# Patient Record
Sex: FEMALE | Race: WHITE
[De-identification: names, ages, dates, MRNs, and addresses within clinical notes are randomized per-mention and may not be internally consistent; named-entity substitution may affect disease eponyms.]

---

## 2018-02-20 ENCOUNTER — HOSPITAL ENCOUNTER (EMERGENCY)
Dept: HOSPITAL 74 - JER | Age: 23
Discharge: HOME | End: 2018-02-20
Payer: COMMERCIAL

## 2018-02-20 VITALS — BODY MASS INDEX: 30.8 KG/M2

## 2018-02-20 VITALS — DIASTOLIC BLOOD PRESSURE: 73 MMHG | SYSTOLIC BLOOD PRESSURE: 122 MMHG | TEMPERATURE: 98.8 F | HEART RATE: 79 BPM

## 2018-02-20 DIAGNOSIS — O23.11: Primary | ICD-10-CM

## 2018-02-20 DIAGNOSIS — N30.01: ICD-10-CM

## 2018-02-20 DIAGNOSIS — Z3A.08: ICD-10-CM

## 2018-02-20 LAB
APPEARANCE UR: (no result)
BASOPHILS # BLD: 0.3 % (ref 0–2)
BILIRUB UR STRIP.AUTO-MCNC: NEGATIVE MG/DL
COLOR UR: (no result)
DEPRECATED RDW RBC AUTO: 14.3 % (ref 11.6–15.6)
EOSINOPHIL # BLD: 1.2 % (ref 0–4.5)
EPITH CASTS URNS QL MICRO: (no result) /HPF
HCT VFR BLD CALC: 42.8 % (ref 32.4–45.2)
HGB BLD-MCNC: 14 GM/DL (ref 10.7–15.3)
INR BLD: 1.05 (ref 0.82–1.09)
KETONES UR QL STRIP: NEGATIVE
LEUKOCYTE ESTERASE UR QL STRIP.AUTO: (no result)
LYMPHOCYTES # BLD: 19.2 % (ref 8–40)
MCH RBC QN AUTO: 26.2 PG (ref 25.7–33.7)
MCHC RBC AUTO-ENTMCNC: 32.6 G/DL (ref 32–36)
MCV RBC: 80.5 FL (ref 80–96)
MONOCYTES # BLD AUTO: 6.7 % (ref 3.8–10.2)
MUCOUS THREADS URNS QL MICRO: (no result)
NEUTROPHILS # BLD: 72.6 % (ref 42.8–82.8)
NITRITE UR QL STRIP: NEGATIVE
PH UR: 6 [PH] (ref 5–8)
PLATELET # BLD AUTO: 214 K/MM3 (ref 134–434)
PMV BLD: 8.4 FL (ref 7.5–11.1)
PROT UR QL STRIP: NEGATIVE
PROT UR QL STRIP: NEGATIVE
PT PNL PPP: 11.9 SEC (ref 9.98–11.88)
RBC # BLD AUTO: 5.32 M/MM3 (ref 3.6–5.2)
RBC # UR STRIP: (no result) /UL
SP GR UR: 1.02 (ref 1–1.03)
UROBILINOGEN UR STRIP-MCNC: NEGATIVE MG/DL (ref 0.2–1)
WBC # BLD AUTO: 12.6 K/MM3 (ref 4–10)

## 2018-02-20 NOTE — PDOC
*Physical Exam





- Vital Signs


 Last Vital Signs











Temp Pulse Resp BP Pulse Ox


 


 98.8 F   79   18   122/73   100 


 


 02/20/18 12:29  02/20/18 12:29  02/20/18 12:29  02/20/18 12:29  02/20/18 12:29














ED Treatment Course





- LABORATORY


CBC & Chemistry Diagram: 


 02/20/18 14:20








Medical Decision Making





- Medical Decision Making





02/20/18 17:00


Ms Mora is a 21-year-old female presents emergency department with a 

complaint of vaginal bleeding.


Patient denies dysuria.


Patient states she is passing clots.


No flank pain.


Patient denies lightheadedness, dizziness, chest pain, shortness of breath





On examination:


Mild lower abdominal tenderness.


Pelvic examination per MONTSERRAT Del Castillo








02/20/18 17:51


 Laboratory Tests











  02/20/18 02/20/18 02/20/18





  14:20 14:20 14:20


 


WBC  12.6 H  


 


Hgb  14.0  D  


 


Hct  42.8  


 


Plt Count  214  


 


INR   1.05 


 


Beta HCG, Quant    55080.3











Patient pending ultrasound





Pt seen by Midlevel Provider under my direct supervision


Pt interviewed and examined


Ancillary studies reviewed





I agree with plan as outlined by Midlevel Provider





*DC/Admit/Observation/Transfer


Diagnosis at time of Disposition: 


 UTI (urinary tract infection), Pregnancy








- Discharge Dispostion


Disposition: HOME


Condition at time of disposition: Stable





- Prescriptions


Prescriptions: 


Nitrofurantoin Monohyd/M-Cryst [Macrobid -] 100 mg PO BID #14 capsule





- Referrals


Referrals: 


Trever Pinon MD [Staff Physician] - 





- Patient Instructions


Printed Discharge Instructions:  DI for Urinary Tract Infection (UTI)


Additional Instructions: 


Your ultrasound today showed 1 intrauterine pregnancy approximately 8 weeks 

along with a heart rate of 164. Ultrasound also showed a cyst. This may be the 

reason for your vaginal bleeding today. You also have a urinary tract 

infection. You were prescribed Macrobid. Please take this medication up 

tomorrow and take it twice a day for one week. Please drink plenty of fluids. 

Follow-up with your OB/GYN this week. You may have Tylenol as needed for pain. 

Please follow the dosing instructions on the bottle.





Return to the emergency department if you have worsening bleeding, cramping, 

lightheadedness, dizziness or any changes in her symptoms.





- Post Discharge Activity


Forms/Work/School Notes:  Back to Work

## 2018-02-20 NOTE — PDOC
History of Present Illness





- General


Chief Complaint: Vaginal Bleeding


Stated Complaint: VAGINAL BLEEDING (PREGNANT)


Time Seen by Provider: 02/20/18 13:57





Past History





- Past Medical History


Allergies/Adverse Reactions: 


 Allergies











Allergy/AdvReac Type Severity Reaction Status Date / Time


 


Penicillins Allergy Severe Swelling Verified 02/20/18 12:28











Home Medications: 


Ambulatory Orders





Nitrofurantoin Monohyd/M-Cryst [Macrobid -] 100 mg PO BID #14 capsule 02/20/18 








Asthma: Yes


Cancer: No


Cardiac Disorders: No


COPD: No


Diabetes: No


HTN: No


Seizures: No


Thyroid Disease: No





- Suicide/Smoking/Psychosocial Hx


Smoking History: Never smoked


Have you smoked in the past 12 months: No


Number of Cigarettes Smoked Daily: 2


If you are a former smoker, when did you quit?: 1 yr ago


Information on smoking cessation initiated: No


'Breaking Loose' booklet given: 11/26/14


Hx Alcohol Use: No


Drug/Substance Use Hx: No


Substance Use Type: None


Hx Substance Use Treatment: No





*Physical Exam





- Vital Signs


 Last Vital Signs











Temp Pulse Resp BP Pulse Ox


 


 98.8 F   79   18   122/73   100 


 


 02/20/18 12:29  02/20/18 12:29  02/20/18 12:29  02/20/18 12:29  02/20/18 12:29














ED Treatment Course





- LABORATORY


CBC & Chemistry Diagram: 


 02/20/18 14:20








*DC/Admit/Observation/Transfer


Diagnosis at time of Disposition: 


UTI (urinary tract infection)


Qualifiers:


 Urinary tract infection type: acute cystitis Hematuria presence: with 

hematuria Qualified Code(s): N30.01 - Acute cystitis with hematuria





Pregnancy


Qualifiers:


 Weeks of gestation: 8 weeks Qualified Code(s): Z3A.08 - 8 weeks gestation of 

pregnancy








- Discharge Dispostion


Disposition: HOME


Condition at time of disposition: Stable


Admit: No





- Referrals


Referrals: 


Trever Pinon MD [Staff Physician] - 





- Patient Instructions


Printed Discharge Instructions:  DI for Urinary Tract Infection (UTI)


Additional Instructions: 


Your ultrasound today showed 1 intrauterine pregnancy approximately 8 weeks 

along with a heart rate of 164. Ultrasound also showed a cyst. This may be the 

reason for your vaginal bleeding today. You also have a urinary tract 

infection. You were prescribed Macrobid. Please take this medication up 

tomorrow and take it twice a day for one week. Please drink plenty of fluids. 

Follow-up with your OB/GYN this week. You may have Tylenol as needed for pain. 

Please follow the dosing instructions on the bottle.





Return to the emergency department if you have worsening bleeding, cramping, 

lightheadedness, dizziness or any changes in her symptoms.





- Post Discharge Activity


Forms/Work/School Notes:  Back to Work

## 2018-12-05 ENCOUNTER — HOSPITAL ENCOUNTER (EMERGENCY)
Dept: HOSPITAL 74 - JERFT | Age: 23
Discharge: HOME | End: 2018-12-05
Payer: COMMERCIAL

## 2018-12-05 VITALS — BODY MASS INDEX: 34.2 KG/M2

## 2018-12-05 VITALS — TEMPERATURE: 98.3 F | HEART RATE: 72 BPM | SYSTOLIC BLOOD PRESSURE: 108 MMHG | DIASTOLIC BLOOD PRESSURE: 48 MMHG

## 2018-12-05 DIAGNOSIS — J06.9: Primary | ICD-10-CM

## 2018-12-05 DIAGNOSIS — F17.210: ICD-10-CM

## 2018-12-05 DIAGNOSIS — J02.9: ICD-10-CM

## 2018-12-05 DIAGNOSIS — J45.909: ICD-10-CM

## 2018-12-05 NOTE — PDOC
History of Present Illness





- General


Chief Complaint: Cold Symptoms


Stated Complaint: COLD SYMPTOMS


Time Seen by Provider: 12/05/18 13:04


History Source: Patient


Exam Limitations: Clinical Condition





- History of Present Illness


Initial Comments: 





12/05/18 13:54


Patient with no significant past medication present with complaint of 2 day 

history of sore throat, body aches, nasal congestion, runny nose and Tactile 

fever and chills with body aches. Patient denies nausea or vomiting or 

abdominal pain. Patient denies any other symptoms


Timing/Duration: other (2 days)





Past History





- Past Medical History


Allergies/Adverse Reactions: 


 Allergies











Allergy/AdvReac Type Severity Reaction Status Date / Time


 


Penicillins Allergy Severe Swelling Verified 12/05/18 12:33











Home Medications: 


Ambulatory Orders





Azithromycin [Zithromax Tri-Floyd (3 DAYS) -] 500 mg PO DAILY #3 tablet 12/05/18 


Ipratropium Bromide 2 spray NS BID PRN #1 spray 12/05/18 


Methylprednisolone [Medrol Dose Floyd] 4 mg PO ASDIR #21 tablet 12/05/18 








Asthma: Yes


Cancer: No


Cardiac Disorders: No


COPD: No


CHF: No


Diabetes: No


HTN: No


Seizures: No


Thyroid Disease: No





- Suicide/Smoking/Psychosocial Hx


Smoking History: Current every day smoker


Have you smoked in the past 12 months: No


Number of Cigarettes Smoked Daily: 3


If you are a former smoker, when did you quit?: 1 yr ago


Information on smoking cessation initiated: No


'Breaking Loose' booklet given: 11/26/14


Hx Alcohol Use: No


Drug/Substance Use Hx: No


Substance Use Type: None


Hx Substance Use Treatment: No





**Review of Systems





- Review of Systems


Able to Perform ROS?: Yes


Is the patient limited English proficient: No


Constitutional: Yes: Chills, Fever, Malaise


HEENTM: Yes: Symptoms Reported, See HPI, Nose Congestion, Throat Pain.  No: Eye 

Pain, Blurred Vision, Tearing, Recent change in vision, Double Vision, Cataracts

, Ear Pain, Ocular Prothesis, Ear Discharge, Nose Pain, Tinnitus, Nose Bleeding

, Hearing Loss, Throat Swelling, Mouth Pain, Dental Problems, Difficulty 

Swallowing, Mouth Swelling, Other


Respiratory: Yes: Symptoms reported, See HPI, Cough (intermittent).  No: 

Orthopnea, Shortness of Breath, SOB with Exertion, SOB at Rest, Stridor, 

Wheezing, Productive cough, Hemoptysis, Other


Cardiac (ROS): No: Symptoms Reported, See HPI, Chest Pain, Edema, Irregular 

Heart Rate, Lightheadedness, Palpitations, Syncope, Chest Tightness, Other


ABD/GI: No: Symptoms Reported, See HPI, Abdominal Distended, Abd. Pain w/ 

defecation, Blood Streaked Bowels, Constipated, Diarrhea, Difficulty Swallowing

, Nausea, Poor Appetite, Poor Fluid Intake, Rectal Bleeding, Vomiting, 

Indigestion, Abdominal cramping, Tarry Stools, Other


All Other Systems: Reviewed and Negative





*Physical Exam





- Vital Signs


 Last Vital Signs











Temp Pulse Resp BP Pulse Ox


 


 98.3 F   72   16   108/48 L  100 


 


 12/05/18 12:34  12/05/18 12:34  12/05/18 12:34  12/05/18 12:34  12/05/18 12:34














- Physical Exam


Comments: 





12/05/18 13:56


GENERAL:


Well developed, well nourished. Awake and alert. No acute distress.


HEENT:  Normocephalic, atraumatic. PERRLA, EOMI. No conjunctival pallor. Sclera 

are non-icteric. Moist mucous membranes. Oropharynx is clear.


NECK: 


Supple. Full ROM. 


CARDIOVASCULAR:


Regular rate and rhythm. No murmurs, rubs, or gallops. Distal pulses are 2+ and 

symmetric. 


PULMONARY: 


No evidence of respiratory distress. Lungs clear to auscultation bilaterally. 

No wheezing, rales or rhonchi.


ABDOMINAL:


Soft. Non-tender. Non-distended. No rebound or guarding. No organomegaly. 

Normoactive bowel sounds. 


MUSCULOSKELETAL 


Normal range of motion at all joints. 


EXTREMITIES: 


No cyanosis. No clubbing. No edema. No calf tenderness.


SKIN: 


Warm and dry. Normal capillary refill. No rashes. No jaundice. 


NEUROLOGICAL: 


Alert, awake, appropriate.  Gait is normal without ataxia.


PSYCHIATRIC: 


Cooperative. Good eye contact. Appropriate mood 


General Appearance: Yes: Nourished, Appropriately Dressed.  No: Apparent 

Distress





Moderate Sedation





- Procedure Monitoring


Vital Signs: 


Procedure Monitoring Vital Signs











Temperature  98.3 F   12/05/18 12:34


 


Pulse Rate  72   12/05/18 12:34


 


Respiratory Rate  16   12/05/18 12:34


 


Blood Pressure  108/48 L  12/05/18 12:34


 


O2 Sat by Pulse Oximetry (%)  100   12/05/18 12:34











Medical Decision Making





- Medical Decision Making





12/05/18 13:56


Patient with no syndrome past medical history presented with complaint of 2 day 

history of URI symptoms with tactile fever and chills and sore throat. Clinical 

exam unremarkable. Patient afebrile now. Rapid strep and rapid flu tests 

ordered. Treat based on lab results


12/05/18 14:07


rapid flu neg. rapid strep still pending


12/05/18 14:35


rapid strep negative. patient will be treated emperically given pt just mention 

friend has similar episode and tested positive for strep. patient stable for 

outpatient tx with medrol-floyd, atrovent nasal spray and zithromax tri-floyd





*DC/Admit/Observation/Transfer


Diagnosis at time of Disposition: 


URI (upper respiratory infection)


Qualifiers:


 URI type: unspecified URI Qualified Code(s): J06.9 - Acute upper respiratory 

infection, unspecified





Pharyngitis


Qualifiers:


 Pharyngitis/tonsillitis etiology: unspecified etiology Qualified Code(s): 

J02.9 - Acute pharyngitis, unspecified








- Discharge Dispostion


Disposition: HOME


Condition at time of disposition: Stable


Decision to Admit order: No





- Prescriptions


Prescriptions: 


Azithromycin [Zithromax Tri-Floyd (3 DAYS) -] 500 mg PO DAILY #3 tablet


Ipratropium Bromide 2 spray NS BID PRN #1 spray


 PRN Reason: nasal congestion


Methylprednisolone [Medrol Dose Floyd] 4 mg PO ASDIR #21 tablet





- Referrals


Referrals: 


Joy Phoenix MD [Primary Care Provider] - 





- Patient Instructions


Printed Discharge Instructions:  DI for Viral Upper Respiratory Infection -- 

Adult


Additional Instructions: 


your rapid strep and flu was negative. you will be contacted with throat 

culture results. take medications as prescribed. increase fluid intake. follow-

up with PCP as needed





- Post Discharge Activity

## 2019-09-03 ENCOUNTER — HOSPITAL ENCOUNTER (EMERGENCY)
Dept: HOSPITAL 74 - JER | Age: 24
Discharge: LEFT BEFORE DISCHARGE | End: 2019-09-03
Payer: COMMERCIAL

## 2021-01-03 ENCOUNTER — HOSPITAL ENCOUNTER (INPATIENT)
Dept: HOSPITAL 74 - JDEL | Age: 26
LOS: 4 days | Discharge: HOME | DRG: 540 | End: 2021-01-07
Attending: OBSTETRICS & GYNECOLOGY | Admitting: OBSTETRICS & GYNECOLOGY
Payer: COMMERCIAL

## 2021-01-03 VITALS — BODY MASS INDEX: 35 KG/M2

## 2021-01-03 DIAGNOSIS — Z3A.35: ICD-10-CM

## 2021-01-03 DIAGNOSIS — Z87.59: ICD-10-CM

## 2021-01-03 DIAGNOSIS — Z88.0: ICD-10-CM

## 2021-01-03 DIAGNOSIS — Z87.09: ICD-10-CM

## 2021-01-03 LAB
AMPHET UR-MCNC: NEGATIVE NG/ML
ANION GAP SERPL CALC-SCNC: 9 MMOL/L (ref 8–16)
ANISOCYTOSIS BLD QL: (no result)
APTT BLD: 28.2 SECONDS (ref 25.2–36.5)
BARBITURATES UR-MCNC: NEGATIVE NG/ML
BASOPHILS # BLD: 0.2 % (ref 0–2)
BENZODIAZ UR SCN-MCNC: NEGATIVE NG/ML
BUN SERPL-MCNC: 4.6 MG/DL (ref 7–18)
CALCIUM SERPL-MCNC: 8.2 MG/DL (ref 8.5–10.1)
CHLORIDE SERPL-SCNC: 107 MMOL/L (ref 98–107)
CO2 SERPL-SCNC: 22 MMOL/L (ref 21–32)
COCAINE UR-MCNC: NEGATIVE NG/ML
CREAT SERPL-MCNC: 0.4 MG/DL (ref 0.55–1.3)
DEPRECATED RDW RBC AUTO: 14.5 % (ref 11.6–15.6)
EOSINOPHIL # BLD: 1 % (ref 0–4.5)
GLUCOSE SERPL-MCNC: 68 MG/DL (ref 74–106)
HCT VFR BLD CALC: 32.8 % (ref 32.4–45.2)
HGB BLD-MCNC: 10.8 GM/DL (ref 10.7–15.3)
INR BLD: 1.06 (ref 0.83–1.09)
LYMPHOCYTES # BLD: 15.4 % (ref 8–40)
MACROCYTES BLD QL: 0
MCH RBC QN AUTO: 26.4 PG (ref 25.7–33.7)
MCHC RBC AUTO-ENTMCNC: 32.8 G/DL (ref 32–36)
MCV RBC: 80.5 FL (ref 80–96)
METHADONE UR-MCNC: NEGATIVE NG/ML
MONOCYTES # BLD AUTO: 7.5 % (ref 3.8–10.2)
NEUTROPHILS # BLD: 75.9 % (ref 42.8–82.8)
OPIATES UR QL SCN: NEGATIVE NG/ML
PCP UR QL SCN: NEGATIVE NG/ML
PLATELET # BLD AUTO: 257 K/MM3 (ref 134–434)
PLATELET BLD QL SMEAR: (no result)
PMV BLD: 9.4 FL (ref 7.5–11.1)
POTASSIUM SERPLBLD-SCNC: 4 MMOL/L (ref 3.5–5.1)
PT PNL PPP: 12.8 SEC (ref 9.7–13)
RBC # BLD AUTO: 4.07 M/MM3 (ref 3.6–5.2)
SODIUM SERPL-SCNC: 139 MMOL/L (ref 136–145)
WBC # BLD AUTO: 19.4 K/MM3 (ref 4–10)

## 2021-01-03 PROCEDURE — 3E033VJ INTRODUCTION OF OTHER HORMONE INTO PERIPHERAL VEIN, PERCUTANEOUS APPROACH: ICD-10-PCS | Performed by: OBSTETRICS & GYNECOLOGY

## 2021-01-03 PROCEDURE — U0003 INFECTIOUS AGENT DETECTION BY NUCLEIC ACID (DNA OR RNA); SEVERE ACUTE RESPIRATORY SYNDROME CORONAVIRUS 2 (SARS-COV-2) (CORONAVIRUS DISEASE [COVID-19]), AMPLIFIED PROBE TECHNIQUE, MAKING USE OF HIGH THROUGHPUT TECHNOLOGIES AS DESCRIBED BY CMS-2020-01-R: HCPCS

## 2021-01-03 PROCEDURE — C9803 HOPD COVID-19 SPEC COLLECT: HCPCS

## 2021-01-03 PROCEDURE — 10H073Z INSERTION OF MONITORING ELECTRODE INTO PRODUCTS OF CONCEPTION, VIA NATURAL OR ARTIFICIAL OPENING: ICD-10-PCS | Performed by: OBSTETRICS & GYNECOLOGY

## 2021-01-04 LAB
BASOPHILS # BLD: 0.2 % (ref 0–2)
DEPRECATED RDW RBC AUTO: 14.1 % (ref 11.6–15.6)
EOSINOPHIL # BLD: 0.7 % (ref 0–4.5)
HCT VFR BLD CALC: 33.6 % (ref 32.4–45.2)
HGB BLD-MCNC: 11.1 GM/DL (ref 10.7–15.3)
LYMPHOCYTES # BLD: 13.2 % (ref 8–40)
MCH RBC QN AUTO: 26.3 PG (ref 25.7–33.7)
MCHC RBC AUTO-ENTMCNC: 33.1 G/DL (ref 32–36)
MCV RBC: 79.4 FL (ref 80–96)
MONOCYTES # BLD AUTO: 9.2 % (ref 3.8–10.2)
NEUTROPHILS # BLD: 76.7 % (ref 42.8–82.8)
PLATELET # BLD AUTO: 266 K/MM3 (ref 134–434)
PMV BLD: 8.7 FL (ref 7.5–11.1)
RBC # BLD AUTO: 4.23 M/MM3 (ref 3.6–5.2)
WBC # BLD AUTO: 18.8 K/MM3 (ref 4–10)

## 2021-01-04 RX ADMIN — IBUPROFEN PRN MG: 600 TABLET, FILM COATED ORAL at 13:46

## 2021-01-04 RX ADMIN — ACETAMINOPHEN PRN MG: 325 TABLET ORAL at 22:32

## 2021-01-04 RX ADMIN — IBUPROFEN PRN MG: 600 TABLET, FILM COATED ORAL at 18:21

## 2021-01-04 RX ADMIN — IBUPROFEN PRN MG: 600 TABLET, FILM COATED ORAL at 22:32

## 2021-01-04 RX ADMIN — FERROUS SULFATE TAB EC 324 MG (65 MG FE EQUIVALENT) SCH MG: 324 (65 FE) TABLET DELAYED RESPONSE at 22:31

## 2021-01-04 RX ADMIN — SIMETHICONE CHEW TAB 80 MG PRN MG: 80 TABLET ORAL at 22:32

## 2021-01-04 RX ADMIN — Medication SCH: at 09:37

## 2021-01-04 RX ADMIN — ACETAMINOPHEN PRN MG: 325 TABLET ORAL at 18:21

## 2021-01-04 RX ADMIN — ACETAMINOPHEN PRN MG: 325 TABLET ORAL at 13:46

## 2021-01-04 RX ADMIN — SIMETHICONE CHEW TAB 80 MG PRN MG: 80 TABLET ORAL at 18:22

## 2021-01-04 RX ADMIN — FERROUS SULFATE TAB EC 324 MG (65 MG FE EQUIVALENT) SCH: 324 (65 FE) TABLET DELAYED RESPONSE at 09:37

## 2021-01-05 RX ADMIN — IBUPROFEN PRN MG: 600 TABLET, FILM COATED ORAL at 07:31

## 2021-01-05 RX ADMIN — Medication SCH TAB: at 10:34

## 2021-01-05 RX ADMIN — SIMETHICONE CHEW TAB 80 MG PRN MG: 80 TABLET ORAL at 07:32

## 2021-01-05 RX ADMIN — FERROUS SULFATE TAB EC 324 MG (65 MG FE EQUIVALENT) SCH MG: 324 (65 FE) TABLET DELAYED RESPONSE at 10:34

## 2021-01-05 RX ADMIN — FERROUS SULFATE TAB EC 324 MG (65 MG FE EQUIVALENT) SCH MG: 324 (65 FE) TABLET DELAYED RESPONSE at 21:29

## 2021-01-05 RX ADMIN — SIMETHICONE CHEW TAB 80 MG PRN MG: 80 TABLET ORAL at 19:34

## 2021-01-05 RX ADMIN — ACETAMINOPHEN PRN MG: 325 TABLET ORAL at 13:59

## 2021-01-05 RX ADMIN — IBUPROFEN PRN MG: 600 TABLET, FILM COATED ORAL at 17:25

## 2021-01-05 RX ADMIN — ACETAMINOPHEN PRN MG: 325 TABLET ORAL at 19:35

## 2021-01-05 RX ADMIN — ACETAMINOPHEN PRN MG: 325 TABLET ORAL at 07:32

## 2021-01-06 LAB
ANISOCYTOSIS BLD QL: (no result)
BASOPHILS # BLD: 0.4 % (ref 0–2)
DEPRECATED RDW RBC AUTO: 14.5 % (ref 11.6–15.6)
EOSINOPHIL # BLD: 3 % (ref 0–4.5)
HCT VFR BLD CALC: 33.9 % (ref 32.4–45.2)
HGB BLD-MCNC: 11 GM/DL (ref 10.7–15.3)
LYMPHOCYTES # BLD: 22.3 % (ref 8–40)
MACROCYTES BLD QL: 0
MCH RBC QN AUTO: 26.2 PG (ref 25.7–33.7)
MCHC RBC AUTO-ENTMCNC: 32.5 G/DL (ref 32–36)
MCV RBC: 80.7 FL (ref 80–96)
MONOCYTES # BLD AUTO: 7.3 % (ref 3.8–10.2)
NEUTROPHILS # BLD: 67 % (ref 42.8–82.8)
PLATELET # BLD AUTO: 274 K/MM3 (ref 134–434)
PLATELET BLD QL SMEAR: NORMAL
PMV BLD: 8.5 FL (ref 7.5–11.1)
RBC # BLD AUTO: 4.2 M/MM3 (ref 3.6–5.2)
WBC # BLD AUTO: 11.7 K/MM3 (ref 4–10)

## 2021-01-06 RX ADMIN — IBUPROFEN PRN MG: 600 TABLET, FILM COATED ORAL at 02:10

## 2021-01-06 RX ADMIN — ACETAMINOPHEN PRN MG: 325 TABLET ORAL at 06:18

## 2021-01-06 RX ADMIN — SIMETHICONE CHEW TAB 80 MG PRN MG: 80 TABLET ORAL at 10:20

## 2021-01-06 RX ADMIN — SIMETHICONE CHEW TAB 80 MG PRN MG: 80 TABLET ORAL at 20:02

## 2021-01-06 RX ADMIN — IBUPROFEN PRN MG: 600 TABLET, FILM COATED ORAL at 20:00

## 2021-01-06 RX ADMIN — FERROUS SULFATE TAB EC 324 MG (65 MG FE EQUIVALENT) SCH MG: 324 (65 FE) TABLET DELAYED RESPONSE at 10:20

## 2021-01-06 RX ADMIN — SIMETHICONE CHEW TAB 80 MG PRN MG: 80 TABLET ORAL at 02:09

## 2021-01-06 RX ADMIN — ACETAMINOPHEN PRN MG: 325 TABLET ORAL at 20:01

## 2021-01-06 RX ADMIN — ACETAMINOPHEN PRN MG: 325 TABLET ORAL at 10:20

## 2021-01-06 RX ADMIN — ACETAMINOPHEN PRN MG: 325 TABLET ORAL at 14:38

## 2021-01-06 RX ADMIN — Medication SCH TAB: at 10:20

## 2021-01-06 RX ADMIN — IBUPROFEN PRN MG: 600 TABLET, FILM COATED ORAL at 06:19

## 2021-01-06 RX ADMIN — IBUPROFEN PRN MG: 600 TABLET, FILM COATED ORAL at 14:36

## 2021-01-06 RX ADMIN — SIMETHICONE CHEW TAB 80 MG PRN MG: 80 TABLET ORAL at 14:36

## 2021-01-06 RX ADMIN — FERROUS SULFATE TAB EC 324 MG (65 MG FE EQUIVALENT) SCH: 324 (65 FE) TABLET DELAYED RESPONSE at 22:00

## 2021-01-07 VITALS — SYSTOLIC BLOOD PRESSURE: 132 MMHG | HEART RATE: 68 BPM | TEMPERATURE: 98.3 F | DIASTOLIC BLOOD PRESSURE: 63 MMHG

## 2021-01-07 RX ADMIN — SIMETHICONE CHEW TAB 80 MG PRN MG: 80 TABLET ORAL at 00:51

## 2021-01-07 RX ADMIN — IBUPROFEN PRN MG: 600 TABLET, FILM COATED ORAL at 06:24

## 2021-01-07 RX ADMIN — Medication SCH TAB: at 10:32

## 2021-01-07 RX ADMIN — ACETAMINOPHEN PRN MG: 325 TABLET ORAL at 10:38

## 2021-01-07 RX ADMIN — FERROUS SULFATE TAB EC 324 MG (65 MG FE EQUIVALENT) SCH MG: 324 (65 FE) TABLET DELAYED RESPONSE at 10:32

## 2021-01-07 RX ADMIN — IBUPROFEN PRN MG: 600 TABLET, FILM COATED ORAL at 10:38

## 2021-01-07 RX ADMIN — SIMETHICONE CHEW TAB 80 MG PRN MG: 80 TABLET ORAL at 10:39

## 2021-01-07 RX ADMIN — ACETAMINOPHEN PRN MG: 325 TABLET ORAL at 06:23

## 2021-01-07 RX ADMIN — SIMETHICONE CHEW TAB 80 MG PRN MG: 80 TABLET ORAL at 06:20

## 2021-01-07 RX ADMIN — IBUPROFEN PRN MG: 600 TABLET, FILM COATED ORAL at 00:48

## 2021-01-07 RX ADMIN — ACETAMINOPHEN PRN MG: 325 TABLET ORAL at 00:48

## 2022-12-07 ENCOUNTER — HOSPITAL ENCOUNTER (INPATIENT)
Dept: HOSPITAL 74 - JLDR | Age: 27
LOS: 3 days | Discharge: HOME | DRG: 540 | End: 2022-12-10
Attending: OBSTETRICS & GYNECOLOGY | Admitting: OBSTETRICS & GYNECOLOGY
Payer: COMMERCIAL

## 2022-12-07 VITALS — BODY MASS INDEX: 30.9 KG/M2

## 2022-12-07 DIAGNOSIS — F12.10: ICD-10-CM

## 2022-12-07 DIAGNOSIS — Z3A.36: ICD-10-CM

## 2022-12-07 DIAGNOSIS — F15.10: ICD-10-CM

## 2022-12-07 DIAGNOSIS — O34.211: ICD-10-CM

## 2022-12-07 DIAGNOSIS — K83.1: ICD-10-CM

## 2022-12-07 LAB
AMPHET UR-MCNC: POSITIVE NG/ML
BARBITURATES UR-MCNC: NEGATIVE UG/ML
BENZODIAZ UR SCN-MCNC: NEGATIVE NG/ML
COCAINE UR-MCNC: NEGATIVE NG/ML
METHADONE UR-MCNC: NEGATIVE UG/L
OPIATES UR QL SCN: NEGATIVE
PCP UR QL SCN: NEGATIVE

## 2022-12-07 PROCEDURE — U0003 INFECTIOUS AGENT DETECTION BY NUCLEIC ACID (DNA OR RNA); SEVERE ACUTE RESPIRATORY SYNDROME CORONAVIRUS 2 (SARS-COV-2) (CORONAVIRUS DISEASE [COVID-19]), AMPLIFIED PROBE TECHNIQUE, MAKING USE OF HIGH THROUGHPUT TECHNOLOGIES AS DESCRIBED BY CMS-2020-01-R: HCPCS

## 2022-12-07 PROCEDURE — U0005 INFEC AGEN DETEC AMPLI PROBE: HCPCS

## 2022-12-07 RX ADMIN — IBUPROFEN PRN MG: 600 TABLET, FILM COATED ORAL at 17:41

## 2022-12-07 RX ADMIN — SIMETHICONE CHEW TAB 80 MG PRN MG: 80 TABLET ORAL at 17:43

## 2022-12-07 RX ADMIN — Medication SCH MLS/HR: at 22:49

## 2022-12-07 RX ADMIN — SIMETHICONE CHEW TAB 80 MG PRN MG: 80 TABLET ORAL at 21:31

## 2022-12-07 RX ADMIN — IBUPROFEN PRN MG: 600 TABLET, FILM COATED ORAL at 21:31

## 2022-12-07 RX ADMIN — FERROUS SULFATE TAB EC 324 MG (65 MG FE EQUIVALENT) SCH MG: 324 (65 FE) TABLET DELAYED RESPONSE at 21:31

## 2022-12-07 RX ADMIN — Medication SCH MLS/HR: at 15:01

## 2022-12-08 LAB
ANISOCYTOSIS BLD QL: 0
BASO STIPL BLD QL SMEAR: 0
DACRYOCYTES BLD QL SMEAR: 0
DEPRECATED RDW RBC AUTO: 13.9 % (ref 11.6–15.6)
DOHLE BOD BLD QL SMEAR: 0
HCT VFR BLD CALC: 31.1 % (ref 32.4–45.2)
HELMET CELLS BLD QL SMEAR: 0
HGB BLD-MCNC: 10.2 GM/DL (ref 10.7–15.3)
HOWELL-JOLLY BOD BLD QL SMEAR: 0
MACROCYTES BLD QL: 0
MCH RBC QN AUTO: 26.7 PG (ref 25.7–33.7)
MCHC RBC AUTO-ENTMCNC: 33 G/DL (ref 32–36)
MCV RBC: 80.8 FL (ref 80–96)
OVALOCYTES BLD QL SMEAR: 0
PLATELET # BLD AUTO: 266 10^3/UL (ref 134–434)
PMV BLD: 8.9 FL (ref 7.5–11.1)
RBC # BLD AUTO: 3.85 M/MM3 (ref 3.6–5.2)
ROULEAUX BLD QL SMEAR: 0
SICKLE CELLS BLD QL SMEAR: 0
TARGETS BLD QL SMEAR: 0
TOXIC GRANULES BLD QL SMEAR: 0
WBC # BLD AUTO: 15.7 K/MM3 (ref 4–10)

## 2022-12-08 RX ADMIN — SIMETHICONE CHEW TAB 80 MG PRN MG: 80 TABLET ORAL at 03:03

## 2022-12-08 RX ADMIN — IBUPROFEN PRN MG: 600 TABLET, FILM COATED ORAL at 03:03

## 2022-12-08 RX ADMIN — IBUPROFEN PRN MG: 600 TABLET, FILM COATED ORAL at 13:55

## 2022-12-08 RX ADMIN — FERROUS SULFATE TAB EC 324 MG (65 MG FE EQUIVALENT) SCH MG: 324 (65 FE) TABLET DELAYED RESPONSE at 11:05

## 2022-12-08 RX ADMIN — FERROUS SULFATE TAB EC 324 MG (65 MG FE EQUIVALENT) SCH MG: 324 (65 FE) TABLET DELAYED RESPONSE at 21:20

## 2022-12-08 RX ADMIN — Medication SCH TAB: at 11:05

## 2022-12-09 RX ADMIN — ACETAMINOPHEN PRN MG: 325 TABLET ORAL at 12:31

## 2022-12-09 RX ADMIN — FERROUS SULFATE TAB EC 324 MG (65 MG FE EQUIVALENT) SCH MG: 324 (65 FE) TABLET DELAYED RESPONSE at 21:52

## 2022-12-09 RX ADMIN — IBUPROFEN PRN MG: 600 TABLET, FILM COATED ORAL at 05:33

## 2022-12-09 RX ADMIN — Medication SCH TAB: at 09:24

## 2022-12-09 RX ADMIN — SIMETHICONE CHEW TAB 80 MG PRN MG: 80 TABLET ORAL at 12:31

## 2022-12-09 RX ADMIN — IBUPROFEN PRN MG: 600 TABLET, FILM COATED ORAL at 09:24

## 2022-12-09 RX ADMIN — ACETAMINOPHEN PRN MG: 325 TABLET ORAL at 16:41

## 2022-12-09 RX ADMIN — IBUPROFEN PRN MG: 600 TABLET, FILM COATED ORAL at 13:48

## 2022-12-09 RX ADMIN — FERROUS SULFATE TAB EC 324 MG (65 MG FE EQUIVALENT) SCH MG: 324 (65 FE) TABLET DELAYED RESPONSE at 09:24

## 2022-12-09 RX ADMIN — IBUPROFEN PRN MG: 600 TABLET, FILM COATED ORAL at 20:27

## 2022-12-09 RX ADMIN — ACETAMINOPHEN PRN MG: 325 TABLET ORAL at 07:25

## 2022-12-09 RX ADMIN — SIMETHICONE CHEW TAB 80 MG PRN MG: 80 TABLET ORAL at 00:56

## 2022-12-09 RX ADMIN — SIMETHICONE CHEW TAB 80 MG PRN MG: 80 TABLET ORAL at 05:33

## 2022-12-09 RX ADMIN — SIMETHICONE CHEW TAB 80 MG PRN MG: 80 TABLET ORAL at 20:27

## 2022-12-10 VITALS
DIASTOLIC BLOOD PRESSURE: 80 MMHG | TEMPERATURE: 98.7 F | SYSTOLIC BLOOD PRESSURE: 121 MMHG | RESPIRATION RATE: 22 BRPM | HEART RATE: 60 BPM

## 2022-12-10 LAB
ANISOCYTOSIS BLD QL: 0
BASO STIPL BLD QL SMEAR: 0
DACRYOCYTES BLD QL SMEAR: 0
DEPRECATED RDW RBC AUTO: 14.3 % (ref 11.6–15.6)
DOHLE BOD BLD QL SMEAR: 0
HCT VFR BLD CALC: 34.8 % (ref 32.4–45.2)
HELMET CELLS BLD QL SMEAR: 0
HGB BLD-MCNC: 11.2 GM/DL (ref 10.7–15.3)
HOWELL-JOLLY BOD BLD QL SMEAR: 0
MACROCYTES BLD QL: 0
MCH RBC QN AUTO: 26.3 PG (ref 25.7–33.7)
MCHC RBC AUTO-ENTMCNC: 32.2 G/DL (ref 32–36)
MCV RBC: 81.9 FL (ref 80–96)
OVALOCYTES BLD QL SMEAR: 0
PLATELET # BLD AUTO: 276 10^3/UL (ref 134–434)
PMV BLD: 8.7 FL (ref 7.5–11.1)
RBC # BLD AUTO: 4.25 M/MM3 (ref 3.6–5.2)
ROULEAUX BLD QL SMEAR: 0
SICKLE CELLS BLD QL SMEAR: 0
TARGETS BLD QL SMEAR: 0
TOXIC GRANULES BLD QL SMEAR: 0
WBC # BLD AUTO: 13.4 K/MM3 (ref 4–10)

## 2022-12-10 RX ADMIN — Medication SCH TAB: at 09:31

## 2022-12-10 RX ADMIN — ACETAMINOPHEN PRN MG: 325 TABLET ORAL at 01:46

## 2022-12-10 RX ADMIN — IBUPROFEN PRN MG: 600 TABLET, FILM COATED ORAL at 14:00

## 2022-12-10 RX ADMIN — SIMETHICONE CHEW TAB 80 MG PRN MG: 80 TABLET ORAL at 09:31

## 2022-12-10 RX ADMIN — ACETAMINOPHEN PRN MG: 325 TABLET ORAL at 09:29

## 2022-12-10 RX ADMIN — FERROUS SULFATE TAB EC 324 MG (65 MG FE EQUIVALENT) SCH MG: 324 (65 FE) TABLET DELAYED RESPONSE at 09:28

## 2022-12-10 RX ADMIN — SIMETHICONE CHEW TAB 80 MG PRN MG: 80 TABLET ORAL at 01:46

## 2022-12-10 RX ADMIN — IBUPROFEN PRN MG: 600 TABLET, FILM COATED ORAL at 06:42

## 2023-03-17 ENCOUNTER — HOSPITAL ENCOUNTER (EMERGENCY)
Dept: HOSPITAL 74 - JERFT | Age: 28
Discharge: HOME | End: 2023-03-17
Payer: COMMERCIAL

## 2023-03-17 VITALS
TEMPERATURE: 98.2 F | HEART RATE: 75 BPM | DIASTOLIC BLOOD PRESSURE: 73 MMHG | SYSTOLIC BLOOD PRESSURE: 121 MMHG | RESPIRATION RATE: 20 BRPM

## 2023-03-17 VITALS — BODY MASS INDEX: 27.7 KG/M2
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
## 2023-03-17 DIAGNOSIS — T74.11XA: ICD-10-CM

## 2023-03-17 DIAGNOSIS — S30.0XXA: ICD-10-CM

## 2023-03-17 DIAGNOSIS — S05.11XA: Primary | ICD-10-CM

## 2023-07-17 ENCOUNTER — HOSPITAL ENCOUNTER (EMERGENCY)
Dept: HOSPITAL 74 - JER | Age: 28
Discharge: HOME | End: 2023-07-17
Payer: SELF-PAY

## 2023-07-17 VITALS
SYSTOLIC BLOOD PRESSURE: 127 MMHG | TEMPERATURE: 98.4 F | DIASTOLIC BLOOD PRESSURE: 75 MMHG | HEART RATE: 86 BPM | RESPIRATION RATE: 18 BRPM

## 2023-07-17 VITALS — BODY MASS INDEX: 24.7 KG/M2

## 2023-07-17 DIAGNOSIS — Z3A.00: ICD-10-CM

## 2023-07-17 DIAGNOSIS — O20.9: Primary | ICD-10-CM

## 2023-07-17 LAB
ALBUMIN SERPL-MCNC: 3.3 G/DL (ref 3.4–5)
ALP SERPL-CCNC: 62 U/L (ref 45–117)
ALT SERPL-CCNC: 29 U/L (ref 13–61)
ANION GAP SERPL CALC-SCNC: 6 MMOL/L (ref 8–16)
AST SERPL-CCNC: 51 U/L (ref 15–37)
BASOPHILS # BLD: 0.4 % (ref 0–2)
BILIRUB SERPL-MCNC: 1.2 MG/DL (ref 0.2–1)
BUN SERPL-MCNC: 12 MG/DL (ref 7–18)
CALCIUM SERPL-MCNC: 8.7 MG/DL (ref 8.5–10.1)
CHLORIDE SERPL-SCNC: 111 MMOL/L (ref 98–107)
CO2 SERPL-SCNC: 23 MMOL/L (ref 21–32)
CREAT SERPL-MCNC: 0.7 MG/DL (ref 0.55–1.3)
DEPRECATED RDW RBC AUTO: 14.9 % (ref 11.6–15.6)
EOSINOPHIL # BLD: 2.7 % (ref 0–4.5)
GLUCOSE SERPL-MCNC: 126 MG/DL (ref 74–106)
HCT VFR BLD CALC: 43.9 % (ref 32.4–45.2)
HGB BLD-MCNC: 14.5 GM/DL (ref 10.7–15.3)
LYMPHOCYTES # BLD: 27.1 % (ref 8–40)
MCH RBC QN AUTO: 27.3 PG (ref 25.7–33.7)
MCHC RBC AUTO-ENTMCNC: 33 G/DL (ref 32–36)
MCV RBC: 82.5 FL (ref 80–96)
MONOCYTES # BLD AUTO: 3.6 % (ref 3.8–10.2)
NEUTROPHILS # BLD: 66.2 % (ref 42.8–82.8)
PLATELET # BLD AUTO: 131 10^3/UL (ref 134–434)
PMV BLD: 9.1 FL (ref 7.5–11.1)
POTASSIUM SERPLBLD-SCNC: 4.9 MMOL/L (ref 3.5–5.1)
PROT SERPL-MCNC: 6.8 G/DL (ref 6.4–8.2)
RBC # BLD AUTO: 5.32 M/MM3 (ref 3.6–5.2)
SODIUM SERPL-SCNC: 140 MMOL/L (ref 136–145)
WBC # BLD AUTO: 10.1 K/MM3 (ref 4–10)

## 2024-04-12 ENCOUNTER — HOSPITAL ENCOUNTER (EMERGENCY)
Age: 29
Discharge: ANOTHER HOSPITAL | End: 2024-04-12
Payer: MEDICAID

## 2024-04-12 VITALS — DIASTOLIC BLOOD PRESSURE: 72 MMHG | HEART RATE: 74 BPM | OXYGEN SATURATION: 99 % | SYSTOLIC BLOOD PRESSURE: 120 MMHG

## 2024-04-12 VITALS
OXYGEN SATURATION: 98 % | HEART RATE: 75 BPM | TEMPERATURE: 98.06 F | SYSTOLIC BLOOD PRESSURE: 111 MMHG | RESPIRATION RATE: 20 BRPM | DIASTOLIC BLOOD PRESSURE: 82 MMHG

## 2024-04-12 VITALS — HEART RATE: 69 BPM | DIASTOLIC BLOOD PRESSURE: 78 MMHG | SYSTOLIC BLOOD PRESSURE: 133 MMHG

## 2024-04-12 VITALS
SYSTOLIC BLOOD PRESSURE: 111 MMHG | DIASTOLIC BLOOD PRESSURE: 82 MMHG | TEMPERATURE: 98 F | HEART RATE: 75 BPM | RESPIRATION RATE: 20 BRPM

## 2024-04-12 VITALS — HEART RATE: 64 BPM | DIASTOLIC BLOOD PRESSURE: 84 MMHG | SYSTOLIC BLOOD PRESSURE: 120 MMHG

## 2024-04-12 VITALS — DIASTOLIC BLOOD PRESSURE: 73 MMHG | SYSTOLIC BLOOD PRESSURE: 132 MMHG | HEART RATE: 68 BPM

## 2024-04-12 VITALS — SYSTOLIC BLOOD PRESSURE: 126 MMHG | OXYGEN SATURATION: 98 % | DIASTOLIC BLOOD PRESSURE: 74 MMHG | HEART RATE: 63 BPM

## 2024-04-12 VITALS — SYSTOLIC BLOOD PRESSURE: 137 MMHG | HEART RATE: 66 BPM | OXYGEN SATURATION: 98 % | DIASTOLIC BLOOD PRESSURE: 87 MMHG

## 2024-04-12 VITALS — BODY MASS INDEX: 35.5 KG/M2

## 2024-04-12 DIAGNOSIS — Z3A.37: ICD-10-CM

## 2024-04-12 DIAGNOSIS — O34.219: ICD-10-CM

## 2024-04-12 LAB
ANION GAP: 14 (ref 12–20)
BLOOD UREA NITROGEN: 9 MG/DL (ref 9–16)
CALCIUM: 8 MG/DL (ref 8.4–10.2)
CARBON DIOXIDE: 19 MMOL/L (ref 22–29)
CHLORIDE: 109 MMOL/L (ref 96–108)
CREAT CL PREDICTED SERPL C-G-VRATE: (no result) ML/MIN
ESTIMATED GLOMERULAR FILT RATE: > 60
HEMATOCRIT: 39.9 % (ref 37–47)
HEMOGLOBIN: 13.3 G/DL (ref 12–16)
IMM GRANULOCYTES # BLD: 0.14 X10*3/UL (ref 0–0.03)
IMM GRANULOCYTES NFR BLD AUTO: 0.7 % (ref 0–0.4)
LYMPHOCYTES  ABSOLUTE AUTO: 2.3 X10*3/UL (ref 1.2–4.9)
MANUAL DIFF FLAG: NO
MEAN CORPUSCULAR HEMOGLOBIN: 26.8 PG (ref 27–33)
MEAN CORPUSCULAR HGB CONC: 33.3 G/DL (ref 31–35)
MEAN CORPUSCULAR VOLUME: 80.3 FL (ref 80–98)
NRBC ABS AUTO: 0 X10*3/UL (ref 0–0.01)
NRBC PCT AUTO: 0 /100WBC (ref 0–0.2)
PLATELET COUNT: 260 X10*3/UL (ref 160–400)
POTASSIUM: 3.9 MMOL/L (ref 3.3–5.1)
RED BLOOD COUNT: 4.97 X10*6/UL (ref 4.2–5.5)
SODIUM: 138 MMOL/L (ref 135–145)
WHITE BLOOD COUNT: 20.5 X10*3/UL (ref 4.8–10.8)

## 2024-04-12 PROCEDURE — 36415 COLL VENOUS BLD VENIPUNCTURE: CPT

## 2024-04-12 PROCEDURE — 80048 BASIC METABOLIC PNL TOTAL CA: CPT

## 2024-04-12 PROCEDURE — 59414 DELIVER PLACENTA: CPT

## 2024-04-12 PROCEDURE — 85025 COMPLETE CBC W/AUTO DIFF WBC: CPT

## 2024-04-12 PROCEDURE — 87340 HEPATITIS B SURFACE AG IA: CPT

## 2024-04-12 PROCEDURE — 99285 EMERGENCY DEPT VISIT HI MDM: CPT

## 2024-04-12 PROCEDURE — 86704 HEP B CORE ANTIBODY TOTAL: CPT

## 2024-04-12 PROCEDURE — 96374 THER/PROPH/DIAG INJ IV PUSH: CPT

## 2024-04-12 PROCEDURE — 86706 HEP B SURFACE ANTIBODY: CPT
